# Patient Record
Sex: MALE | Race: WHITE | Employment: OTHER | URBAN - METROPOLITAN AREA
[De-identification: names, ages, dates, MRNs, and addresses within clinical notes are randomized per-mention and may not be internally consistent; named-entity substitution may affect disease eponyms.]

---

## 2018-08-22 PROBLEM — C85.90 NON-HODGKIN'S LYMPHOMA (HCC): Status: ACTIVE | Noted: 2018-08-22

## 2018-08-22 PROBLEM — E11.9 TYPE 2 DIABETES MELLITUS (HCC): Status: ACTIVE | Noted: 2018-08-22

## 2022-06-10 ENCOUNTER — TELEPHONE (OUTPATIENT)
Dept: PHARMACY | Facility: CLINIC | Age: 77
End: 2022-06-10

## 2022-06-10 NOTE — TELEPHONE ENCOUNTER
[facility]: ADHERENCE REVIEW  Identified care gap per [place]: fills at [facility] Aid: ACE/ARB and Statin adherence    Last Visit: 5/25/22    ASSESSMENT  ACE/ARB ADHERENCE    Insurance Records claims through [phone] VANE Lisandro Headandra = Filled only once; Potential Fail Date: 6/14/22):   LOSARTAN POTASSIUM 25 MG Next refill due: 4/3/22    Per [place] Portal:  Same as above, last RF 1/3/22    Per Marco [address]:   Last refill was picked up on 4/25 and was billed to discount card @$22. NR left. Asked staff to flag it so insurance is used next time. BP Readings from Last 3 Encounters:   05/25/22 110/70   04/14/22 120/70   04/08/22 130/72     CrCl cannot be calculated (Patient's most recent lab result is older than the maximum 180 days allowed. ). [address] Records claims through [phone]  [facility] [place] = 99%; Potential Fail Date: 9/13/22):   SIMVASTATIN 40 MG  Next refill due: 7/3/22    New script avail    Lab Results   Component Value Date    CHOL 156 05/19/2021    TRIG 196 05/19/2021    HDL 36 05/19/2021    LDLCALC 80 05/19/2021     ALT   Date Value Ref Range Status   05/19/2021 24.0 U/L Final     AST   Date Value Ref Range Status   05/19/2021 20 U/L Final     The 10-year ASCVD risk score (Noreen Holder et al., 2013) is: 46.2%    Values used to calculate the score:      Age: 65 years      Sex: Male      Is Non- : No      Diabetic: Yes      Tobacco smoker: No      Systolic Blood Pressure: 045 mmHg      Is BP treated: Yes      HDL Cholesterol: 36 mg/dL      Total Cholesterol: 156 mg/dL     PLAN  The following are interventions that have been identified:   - Patient overdue refilling LOSARTAN POTASSIUM 25 MG and active on home medication list.     Attempting to reach patient to review.  Left message asking for return call.      Inform patient to be sure [employer] and not discount card-for his own benefit      Future Appointments   Date Time Provider Department Center   7/13/2022  1:45 PM MD Delaney Lorenzo MD None       Cassidy Haddad [facility].    [address] [facility] free: [address] [facility] Ext in place:  No   Gap Closed?: No    Time Spent (min): 10

## 2023-02-28 PROBLEM — H35.62 RETINAL HEMORRHAGE OF LEFT EYE: Status: ACTIVE | Noted: 2023-02-28

## 2024-03-05 PROBLEM — C83.30 LYMPHOMA, LARGE-CELL, DIFFUSE (HCC): Status: ACTIVE | Noted: 2018-02-17

## 2024-03-05 PROBLEM — T45.1X5A ANEMIA DUE TO ANTINEOPLASTIC AGENT: Status: ACTIVE | Noted: 2018-02-17

## 2024-03-05 PROBLEM — I50.32 CHRONIC DIASTOLIC HEART FAILURE (HCC): Status: ACTIVE | Noted: 2020-09-17

## 2024-03-05 PROBLEM — M54.17 PAIN FROM LUMBOSACRAL NERVE ROOT: Status: ACTIVE | Noted: 2017-11-29

## 2024-03-05 PROBLEM — I10 PRIMARY HYPERTENSION: Status: ACTIVE | Noted: 2024-03-05

## 2024-03-05 PROBLEM — I44.0 FIRST DEGREE ATRIOVENTRICULAR BLOCK: Status: ACTIVE | Noted: 2024-01-12

## 2024-03-05 PROBLEM — C79.51 SECONDARY MALIGNANT NEOPLASM OF LUMBAR VERTEBRAL COLUMN (HCC): Status: ACTIVE | Noted: 2018-12-26

## 2024-03-05 PROBLEM — D64.81 ANEMIA DUE TO ANTINEOPLASTIC AGENT: Status: ACTIVE | Noted: 2018-02-17

## 2024-05-06 PROBLEM — C79.51 SECONDARY MALIGNANT NEOPLASM OF LUMBAR VERTEBRAL COLUMN (HCC): Status: RESOLVED | Noted: 2018-12-26 | Resolved: 2024-05-06

## 2024-09-24 ENCOUNTER — HOSPITAL ENCOUNTER (OUTPATIENT)
Dept: PULMONOLOGY | Age: 79
Discharge: HOME | End: 2024-09-24
Payer: COMMERCIAL

## 2024-09-24 DIAGNOSIS — R06.02 SHORTNESS OF BREATH: ICD-10-CM

## 2024-09-24 LAB
DLCO %PRED: NORMAL
DLCO PRED: NORMAL
DLCO/VA %PRED: NORMAL
DLCO/VA PRED: NORMAL
DLCO/VA: NORMAL
DLCO: NORMAL
EXPIRATORY TIME: NORMAL
FEF 25-75% %PRED-PRE: NORMAL
FEF 25-75% PRED: NORMAL
FEF 25-75-PRE: NORMAL
FEV1 %PRED-PRE: NORMAL
FEV1 PRED: NORMAL
FEV1/FVC %PRED-PRE: NORMAL
FEV1/FVC PRED: NORMAL
FEV1/FVC: NORMAL
FEV1: NORMAL
FVC %PRED-PRE: NORMAL
FVC PRED: NORMAL
FVC: NORMAL
GAW %PRED: NORMAL
GAW PRED: NORMAL
GAW: NORMAL
IC PRE %PRED: NORMAL
IC PRED: NORMAL
IC: NORMAL
MVV %PRED-PRE: NORMAL
MVV PRED: NORMAL
MVV-PRE: NORMAL
PEF %PRED-PRE: NORMAL
PEF PRED: NORMAL
PEF-PRE: NORMAL
RAW %PRED: NORMAL
RAW PRED: NORMAL
RAW: NORMAL
RV PRE %PRED: NORMAL
RV PRED: NORMAL
RV: NORMAL
SVC %PRED: NORMAL
SVC PRED: NORMAL
SVC: NORMAL
TLC PRE %PRED: NORMAL
TLC PRED: NORMAL
TLC: NORMAL
VA %PRED: NORMAL
VA PRED: NORMAL
VA: NORMAL
VTG %PRED: NORMAL
VTG PRED: NORMAL
VTG: NORMAL

## 2024-09-24 PROCEDURE — 94060 EVALUATION OF WHEEZING: CPT

## 2024-09-24 PROCEDURE — 94729 DIFFUSING CAPACITY: CPT

## 2024-09-24 PROCEDURE — 6370000000 HC RX 637 (ALT 250 FOR IP)

## 2024-09-24 PROCEDURE — 94664 DEMO&/EVAL PT USE INHALER: CPT

## 2024-09-24 PROCEDURE — 94726 PLETHYSMOGRAPHY LUNG VOLUMES: CPT

## 2024-09-24 RX ORDER — ALBUTEROL SULFATE 90 UG/1
2 INHALANT RESPIRATORY (INHALATION) ONCE
Status: COMPLETED | OUTPATIENT
Start: 2024-09-24 | End: 2024-09-24

## 2024-09-24 RX ADMIN — ALBUTEROL SULFATE 2 PUFF: 90 AEROSOL, METERED RESPIRATORY (INHALATION) at 09:15
